# Patient Record
Sex: MALE | Race: WHITE | NOT HISPANIC OR LATINO | URBAN - METROPOLITAN AREA
[De-identification: names, ages, dates, MRNs, and addresses within clinical notes are randomized per-mention and may not be internally consistent; named-entity substitution may affect disease eponyms.]

---

## 2023-05-21 ENCOUNTER — OFFICE VISIT (OUTPATIENT)
Dept: URGENT CARE | Facility: CLINIC | Age: 48
End: 2023-05-21
Payer: COMMERCIAL

## 2023-05-21 VITALS
TEMPERATURE: 98 F | BODY MASS INDEX: 29.4 KG/M2 | HEART RATE: 61 BPM | HEIGHT: 71 IN | WEIGHT: 210 LBS | DIASTOLIC BLOOD PRESSURE: 95 MMHG | OXYGEN SATURATION: 99 % | SYSTOLIC BLOOD PRESSURE: 144 MMHG | RESPIRATION RATE: 18 BRPM

## 2023-05-21 DIAGNOSIS — S05.02XA ABRASION OF LEFT CORNEA, INITIAL ENCOUNTER: Primary | ICD-10-CM

## 2023-05-21 PROCEDURE — 99203 OFFICE O/P NEW LOW 30 MIN: CPT | Mod: ,,, | Performed by: FAMILY MEDICINE

## 2023-05-21 PROCEDURE — 99203 PR OFFICE/OUTPT VISIT, NEW, LEVL III, 30-44 MIN: ICD-10-PCS | Mod: ,,, | Performed by: FAMILY MEDICINE

## 2023-05-21 RX ORDER — KETOROLAC TROMETHAMINE 5 MG/ML
1 SOLUTION OPHTHALMIC 4 TIMES DAILY
Qty: 1.87 ML | Refills: 0 | Status: SHIPPED | OUTPATIENT
Start: 2023-05-21 | End: 2023-05-28

## 2023-05-21 RX ORDER — OFLOXACIN 3 MG/ML
1 SOLUTION/ DROPS OPHTHALMIC 4 TIMES DAILY
Qty: 5 ML | Refills: 0 | Status: SHIPPED | OUTPATIENT
Start: 2023-05-21 | End: 2023-05-28

## 2023-05-21 NOTE — PROGRESS NOTES
"Subjective:      Patient ID: Christopher Bustillo is a 47 y.o. male.    Vitals:  height is 5' 11" (1.803 m) and weight is 95.3 kg (210 lb). His temperature is 97.9 °F (36.6 °C). His blood pressure is 144/95 (abnormal) and his pulse is 61. His respiration is 18 and oxygen saturation is 99%.     Chief Complaint: Eye Problem    47 y.o. male presents to clinic w/ c/o redness, pain in L eye, clear drainage x4d. Alleviating factors used include rinses of the eye w/ water w/ improvement. Denies any known injury or trauma to eye, light sensitivity, pain w/ movement of eye, changes in vision, visual disturbances.  States this all started today he was golfing 3 days ago.  States it started to feel better but then he started having some pain in the eye.  States she looked but did not find any foreign bodies.  Does not wear contact lenses.    Eye Problem   Associated symptoms include eye redness.   Constitution: Negative.   HENT: Negative.     Neck: neck negative.   Cardiovascular: Negative.    Eyes:  Positive for eye pain and eye redness.   Respiratory: Negative.     Gastrointestinal: Negative.    Genitourinary: Negative.    Musculoskeletal: Negative.    Skin: Negative.    Allergic/Immunologic: Negative.    Neurological: Negative.    Hematologic/Lymphatic: Negative.     Objective:     Physical Exam   Constitutional: He is oriented to person, place, and time.  Non-toxic appearance. He does not appear ill. No distress.   HENT:   Head: Normocephalic and atraumatic.   Eyes: Pupils are equal, round, and reactive to light.     Extraocular movement intact      Comments: On eye exam there appears to be a cloudy defect medial to the pupil.  On Wood's lamp evaluation there is increased uptake in this area     Pulmonary/Chest: Effort normal.   Abdominal: Normal appearance.   Neurological: He is alert and oriented to person, place, and time.   Skin: Skin is not diaphoretic.   Psychiatric: His behavior is normal. Mood, judgment and thought " content normal.   Vitals reviewed.    Assessment:     1. Abrasion of left cornea, initial encounter        Plan:   Medications sent to pharmacy.  As discussed there is a discoloration where the uptake is seen on the Wood's lamp evaluation.  This is somewhat concerning and the size of the defect is concerning therefore you are being referred to Ophthalmology.  We will try to arrange for an appointment tomorrow.  Our office or the eye doctor's office will contact you with more information.  If your pain worsens overnight go to the emergency department    Abrasion of left cornea, initial encounter  -     Ambulatory referral/consult to Ophthalmology    Other orders  -     ofloxacin (OCUFLOX) 0.3 % ophthalmic solution; Place 1 drop into the left eye 4 (four) times daily. for 7 days  Dispense: 5 mL; Refill: 0  -     ketorolac 0.5% (ACULAR) 0.5 % Drop; Place 1 drop into the left eye 4 (four) times daily. for 7 days  Dispense: 1.87 mL; Refill: 0

## 2023-05-21 NOTE — PATIENT INSTRUCTIONS
Medications sent to pharmacy.  As discussed there is a discoloration where the uptake is seen on the Wood's lamp evaluation.  This is somewhat concerning and the size of the defect is concerning therefore you are being referred to Ophthalmology.  We will try to arrange for an appointment tomorrow.  Our office or the eye doctor's office will contact you with more information.  If your pain worsens overnight go to the emergency department